# Patient Record
Sex: FEMALE | URBAN - METROPOLITAN AREA
[De-identification: names, ages, dates, MRNs, and addresses within clinical notes are randomized per-mention and may not be internally consistent; named-entity substitution may affect disease eponyms.]

---

## 2021-02-22 ENCOUNTER — OFFICE VISIT (OUTPATIENT)
Dept: URBAN - METROPOLITAN AREA CLINIC 7 | Facility: CLINIC | Age: 76
End: 2021-02-22
Payer: MEDICARE

## 2021-02-22 DIAGNOSIS — H34.8110 CENTRAL RETINAL VEIN OCCLUSION, RIGHT EYE, WITH MACULAR EDEMA: Primary | ICD-10-CM

## 2021-02-22 DIAGNOSIS — H35.371 PUCKERING OF MACULA, RIGHT EYE: ICD-10-CM

## 2021-02-22 DIAGNOSIS — H43.813 VITREOUS DEGENERATION, BILATERAL: ICD-10-CM

## 2021-02-22 PROCEDURE — 99214 OFFICE O/P EST MOD 30 MIN: CPT | Performed by: OPHTHALMOLOGY

## 2021-02-22 PROCEDURE — 92134 CPTRZ OPH DX IMG PST SGM RTA: CPT | Performed by: OPHTHALMOLOGY

## 2021-02-22 ASSESSMENT — INTRAOCULAR PRESSURE
OD: 19
OS: 19

## 2021-02-22 NOTE — IMPRESSION/PLAN
Impression: Puckering of macula, right eye: H35.371. Plan: The patient has an ERM in the setting of CRVO and thus we will observe for now. Patient knows to call with any decrease in vision and increase in metamorphopsia.

## 2021-02-22 NOTE — IMPRESSION/PLAN
Impression: Central retinal vein occlusion, right eye, with macular edema: H34.8110. OCT OU =ERM with resolved IRF   OD, no SRF/IRF 
s/p Avastin OD 4/2/18 Plan: Mild edema in presence of ERM OD s/p Avastin x 1 on 4/1/18. Not central.
RBAC's of treat prn vs treat standing vs treat and extend d/w patient. Rec obs as stable. 

12m OCT OU re-eval Avastin x CRVO OD (Obs & ext)

## 2021-02-22 NOTE — IMPRESSION/PLAN
Impression: Benign neoplasm of right choroid: D31.31. Plan: The patient has a choroidal nevus. We discussed the small risk of progression into malignant melanoma. We also discussed the importance of monitoring.

## 2021-11-01 ENCOUNTER — OFFICE VISIT (OUTPATIENT)
Dept: URBAN - METROPOLITAN AREA CLINIC 7 | Facility: CLINIC | Age: 76
End: 2021-11-01
Payer: MEDICARE

## 2021-11-01 DIAGNOSIS — H25.13 AGE-RELATED NUCLEAR CATARACT, BILATERAL: ICD-10-CM

## 2021-11-01 DIAGNOSIS — D31.31 BENIGN NEOPLASM OF RIGHT CHOROID: ICD-10-CM

## 2021-11-01 PROCEDURE — 92134 CPTRZ OPH DX IMG PST SGM RTA: CPT | Performed by: OPHTHALMOLOGY

## 2021-11-01 PROCEDURE — 99214 OFFICE O/P EST MOD 30 MIN: CPT | Performed by: OPHTHALMOLOGY

## 2021-11-01 ASSESSMENT — INTRAOCULAR PRESSURE
OD: 21
OS: 19

## 2021-11-01 NOTE — IMPRESSION/PLAN
Impression: Central retinal vein occlusion, right eye, with macular edema: H34.8110. OCT OU =ERM with no SRF/IRF OD, no SRF/IRF OS  / 290 
s/p Avastin OD 4/2/18 Plan: Resolved edema with observation. RBAC's of treat prn vs treat standing vs treat and extend d/w patient. Rec obs as stable. 

12m OCT OU re-eval Avastin x CRVO OD (Obs & ext)